# Patient Record
Sex: FEMALE | Race: WHITE | NOT HISPANIC OR LATINO
[De-identification: names, ages, dates, MRNs, and addresses within clinical notes are randomized per-mention and may not be internally consistent; named-entity substitution may affect disease eponyms.]

---

## 2017-10-12 PROBLEM — Z00.00 ENCOUNTER FOR PREVENTIVE HEALTH EXAMINATION: Status: ACTIVE | Noted: 2017-10-12

## 2017-11-16 ENCOUNTER — APPOINTMENT (OUTPATIENT)
Dept: RHEUMATOLOGY | Facility: CLINIC | Age: 37
End: 2017-11-16
Payer: COMMERCIAL

## 2017-11-16 VITALS
SYSTOLIC BLOOD PRESSURE: 122 MMHG | TEMPERATURE: 98.8 F | WEIGHT: 140 LBS | DIASTOLIC BLOOD PRESSURE: 82 MMHG | OXYGEN SATURATION: 98 % | HEART RATE: 83 BPM | BODY MASS INDEX: 22.5 KG/M2 | HEIGHT: 66 IN

## 2017-11-16 DIAGNOSIS — M21.41 FLAT FOOT [PES PLANUS] (ACQUIRED), RIGHT FOOT: ICD-10-CM

## 2017-11-16 DIAGNOSIS — Z82.61 FAMILY HISTORY OF ARTHRITIS: ICD-10-CM

## 2017-11-16 DIAGNOSIS — M76.822 POSTERIOR TIBIAL TENDINITIS, LEFT LEG: ICD-10-CM

## 2017-11-16 DIAGNOSIS — Z86.19 PERSONAL HISTORY OF OTHER INFECTIOUS AND PARASITIC DISEASES: ICD-10-CM

## 2017-11-16 DIAGNOSIS — M25.50 PAIN IN UNSPECIFIED JOINT: ICD-10-CM

## 2017-11-16 DIAGNOSIS — M21.42 FLAT FOOT [PES PLANUS] (ACQUIRED), RIGHT FOOT: ICD-10-CM

## 2017-11-16 DIAGNOSIS — Z80.3 FAMILY HISTORY OF MALIGNANT NEOPLASM OF BREAST: ICD-10-CM

## 2017-11-16 DIAGNOSIS — M79.671 PAIN IN RIGHT FOOT: ICD-10-CM

## 2017-11-16 DIAGNOSIS — T14.8XXA OTHER INJURY OF UNSPECIFIED BODY REGION, INITIAL ENCOUNTER: ICD-10-CM

## 2017-11-16 DIAGNOSIS — F41.9 ANXIETY DISORDER, UNSPECIFIED: ICD-10-CM

## 2017-11-16 DIAGNOSIS — Z72.0 TOBACCO USE: ICD-10-CM

## 2017-11-16 DIAGNOSIS — M25.562 PAIN IN RIGHT KNEE: ICD-10-CM

## 2017-11-16 DIAGNOSIS — M25.521 PAIN IN RIGHT ELBOW: ICD-10-CM

## 2017-11-16 DIAGNOSIS — Z86.59 PERSONAL HISTORY OF OTHER MENTAL AND BEHAVIORAL DISORDERS: ICD-10-CM

## 2017-11-16 DIAGNOSIS — M25.522 PAIN IN RIGHT ELBOW: ICD-10-CM

## 2017-11-16 DIAGNOSIS — M47.22 OTHER SPONDYLOSIS WITH RADICULOPATHY, CERVICAL REGION: ICD-10-CM

## 2017-11-16 DIAGNOSIS — M79.672 PAIN IN RIGHT FOOT: ICD-10-CM

## 2017-11-16 DIAGNOSIS — G89.29 PAIN IN RIGHT KNEE: ICD-10-CM

## 2017-11-16 DIAGNOSIS — M25.561 PAIN IN RIGHT KNEE: ICD-10-CM

## 2017-11-16 DIAGNOSIS — M62.838 OTHER MUSCLE SPASM: ICD-10-CM

## 2017-11-16 PROCEDURE — 99204 OFFICE O/P NEW MOD 45 MIN: CPT | Mod: 25

## 2017-11-16 PROCEDURE — 36415 COLL VENOUS BLD VENIPUNCTURE: CPT

## 2017-11-16 PROCEDURE — 20552 NJX 1/MLT TRIGGER POINT 1/2: CPT

## 2017-11-16 RX ORDER — QUETIAPINE 25 MG/1
25 TABLET, FILM COATED ORAL
Refills: 0 | Status: ACTIVE | COMMUNITY

## 2017-11-16 RX ORDER — METHYLPREDNISOLONE ACETATE 40 MG/ML
40 INJECTION, SUSPENSION INTRA-ARTICULAR; INTRALESIONAL; INTRAMUSCULAR; SOFT TISSUE
Qty: 1 | Refills: 0 | Status: COMPLETED | OUTPATIENT
Start: 2017-11-16

## 2017-11-16 RX ORDER — LIDOCAINE HYDROCHLORIDE 10 MG/ML
1 INJECTION, SOLUTION INFILTRATION; PERINEURAL
Qty: 0 | Refills: 0 | Status: COMPLETED | OUTPATIENT
Start: 2017-11-16

## 2017-11-17 PROBLEM — M25.521 PAIN OF BOTH ELBOWS: Status: ACTIVE | Noted: 2017-11-16

## 2017-11-17 PROBLEM — M21.41 PES PLANUS OF BOTH FEET: Status: ACTIVE | Noted: 2017-11-16

## 2017-11-17 PROBLEM — M62.838 TRAPEZIUS MUSCLE SPASM: Status: ACTIVE | Noted: 2017-11-16

## 2017-11-17 PROBLEM — M79.671 PAIN IN BOTH FEET: Status: ACTIVE | Noted: 2017-11-16

## 2017-11-17 PROBLEM — M25.561 CHRONIC PAIN OF BOTH KNEES: Status: ACTIVE | Noted: 2017-11-16

## 2017-11-17 PROBLEM — M25.50 ARTHRALGIA OF MULTIPLE SITES: Status: ACTIVE | Noted: 2017-11-16

## 2017-11-17 PROBLEM — T14.8XXA OVERUSE INJURY: Status: ACTIVE | Noted: 2017-11-16

## 2017-11-17 PROBLEM — M47.22 OSTEOARTHRITIS OF SPINE WITH RADICULOPATHY, CERVICAL REGION: Status: ACTIVE | Noted: 2017-11-16

## 2017-11-17 PROBLEM — M76.822 POSTERIOR TIBIAL TENDINITIS OF LEFT LOWER EXTREMITY: Status: ACTIVE | Noted: 2017-11-17

## 2017-12-29 LAB
A PHAGOCYTOPH IGG TITR SER IF: NORMAL TITER
ALBUMIN SERPL ELPH-MCNC: 4.7 G/DL
ALP BLD-CCNC: 65 U/L
ALT SERPL-CCNC: 16 U/L
ANA SER IF-ACNC: NEGATIVE
ANION GAP SERPL CALC-SCNC: 18 MMOL/L
AST SERPL-CCNC: 23 U/L
B BURGDOR AB SER QL IA: NEGATIVE
B BURGDOR AB SER-IMP: NEGATIVE
B BURGDOR IGM PATRN SER IB-IMP: POSITIVE
B BURGDOR18/20KD IGM SER QL IB: NORMAL
B BURGDOR18KD IGG SER QL IB: NORMAL
B BURGDOR23KD IGG SER QL IB: NORMAL
B BURGDOR23KD IGM SER QL IB: PRESENT
B BURGDOR28KD AB SER QL IB: NORMAL
B BURGDOR28KD IGG SER QL IB: NORMAL
B BURGDOR30KD AB SER QL IB: NORMAL
B BURGDOR30KD IGG SER QL IB: NORMAL
B BURGDOR31KD IGG SER QL IB: NORMAL
B BURGDOR31KD IGM SER QL IB: NORMAL
B BURGDOR39KD IGG SER QL IB: NORMAL
B BURGDOR39KD IGM SER QL IB: NORMAL
B BURGDOR41KD IGG SER QL IB: NORMAL
B BURGDOR41KD IGM SER QL IB: PRESENT
B BURGDOR45KD AB SER QL IB: NORMAL
B BURGDOR45KD IGG SER QL IB: NORMAL
B BURGDOR58KD AB SER QL IB: NORMAL
B BURGDOR58KD IGG SER QL IB: NORMAL
B BURGDOR66KD IGG SER QL IB: NORMAL
B BURGDOR66KD IGM SER QL IB: NORMAL
B BURGDOR93KD IGG SER QL IB: NORMAL
B BURGDOR93KD IGM SER QL IB: NORMAL
B MICROTI IGG TITR SER: NORMAL TITER
BASOPHILS # BLD AUTO: 0.03 K/UL
BASOPHILS NFR BLD AUTO: 0.4 %
BILIRUB SERPL-MCNC: 0.3 MG/DL
BUN SERPL-MCNC: 18 MG/DL
CALCIUM SERPL-MCNC: 9.2 MG/DL
CCP AB SER IA-ACNC: <8 UNITS
CHLORIDE SERPL-SCNC: 103 MMOL/L
CO2 SERPL-SCNC: 23 MMOL/L
CREAT SERPL-MCNC: 0.78 MG/DL
CRP SERPL-MCNC: 0.2 MG/DL
E CHAFFEENSIS IGG TITR SER IF: NORMAL TITER
EOSINOPHIL # BLD AUTO: 0.06 K/UL
EOSINOPHIL NFR BLD AUTO: 0.9
ERYTHROCYTE [SEDIMENTATION RATE] IN BLOOD BY WESTERGREN METHOD: 13 MM/HR
GLUCOSE SERPL-MCNC: 73 MG/DL
HCT VFR BLD CALC: 42.6 %
HGB BLD-MCNC: 14.3 G/DL
IMM GRANULOCYTES NFR BLD AUTO: 0.3 %
LYMPHOCYTES # BLD AUTO: 2.27 K/UL
LYMPHOCYTES NFR BLD AUTO: 33.2 %
MAN DIFF?: NORMAL
MCHC RBC-ENTMCNC: 30.3 PG
MCHC RBC-ENTMCNC: 33.6 GM/DL
MCV RBC AUTO: 90.3 FL
MONOCYTES # BLD AUTO: 0.58 K/UL
MONOCYTES NFR BLD AUTO: 8.5 %
NEUTROPHILS # BLD AUTO: 3.87 K/UL
NEUTROPHILS NFR BLD AUTO: 56.7 %
PLATELET # BLD AUTO: 396 K/UL
POTASSIUM SERPL-SCNC: 4.2 MMOL/L
PROT SERPL-MCNC: 7.1 G/DL
RBC # BLD: 4.72 M/UL
RBC # FLD: 12.4 %
RF+CCP IGG SER-IMP: NEGATIVE
RHEUMATOID FACT SER QL: <7 IU/ML
SODIUM SERPL-SCNC: 144 MMOL/L
URATE SERPL-MCNC: 5.8 MG/DL
WBC # FLD AUTO: 6.83 K/UL

## 2018-01-11 ENCOUNTER — APPOINTMENT (OUTPATIENT)
Dept: RHEUMATOLOGY | Facility: CLINIC | Age: 38
End: 2018-01-11

## 2022-04-11 ENCOUNTER — APPOINTMENT (OUTPATIENT)
Dept: ORTHOPEDIC SURGERY | Facility: CLINIC | Age: 42
End: 2022-04-11

## 2022-04-11 ENCOUNTER — APPOINTMENT (OUTPATIENT)
Dept: ORTHOPEDIC SURGERY | Facility: CLINIC | Age: 42
End: 2022-04-11
Payer: MEDICAID

## 2022-04-11 VITALS — HEIGHT: 66 IN | BODY MASS INDEX: 24.11 KG/M2 | WEIGHT: 150 LBS

## 2022-04-11 DIAGNOSIS — Z78.9 OTHER SPECIFIED HEALTH STATUS: ICD-10-CM

## 2022-04-11 PROCEDURE — 73080 X-RAY EXAM OF ELBOW: CPT | Mod: RT

## 2022-04-11 PROCEDURE — 99024 POSTOP FOLLOW-UP VISIT: CPT

## 2022-04-11 RX ORDER — ESCITALOPRAM OXALATE 10 MG/1
10 TABLET ORAL
Qty: 30 | Refills: 0 | Status: ACTIVE | COMMUNITY
Start: 2021-09-02

## 2022-04-11 RX ORDER — OXYCODONE 5 MG/1
5 TABLET ORAL
Qty: 15 | Refills: 0 | Status: ACTIVE | COMMUNITY
Start: 2022-02-28

## 2022-04-11 RX ORDER — ARIPIPRAZOLE 20 MG/1
20 TABLET ORAL
Qty: 30 | Refills: 0 | Status: ACTIVE | COMMUNITY
Start: 2022-01-21

## 2022-04-11 RX ORDER — DEXTROAMPHETAMINE SACCHARATE, AMPHETAMINE ASPARTATE, DEXTROAMPHETAMINE SULFATE AND AMPHETAMINE SULFATE 5; 5; 5; 5 MG/1; MG/1; MG/1; MG/1
20 TABLET ORAL
Qty: 30 | Refills: 0 | Status: ACTIVE | COMMUNITY
Start: 2022-01-06

## 2022-04-11 RX ORDER — HYDROXYZINE HYDROCHLORIDE 25 MG/1
25 TABLET ORAL
Qty: 60 | Refills: 0 | Status: ACTIVE | COMMUNITY
Start: 2022-02-22

## 2022-04-11 RX ORDER — HYDROXYZINE PAMOATE 50 MG/1
50 CAPSULE ORAL
Qty: 30 | Refills: 0 | Status: ACTIVE | COMMUNITY
Start: 2022-02-05

## 2022-04-11 RX ORDER — ALPRAZOLAM 0.5 MG/1
0.5 TABLET ORAL
Qty: 2 | Refills: 0 | Status: ACTIVE | COMMUNITY
Start: 2022-02-03

## 2022-04-11 RX ORDER — HYDROXYZINE HYDROCHLORIDE 50 MG/1
50 TABLET ORAL
Qty: 60 | Refills: 0 | Status: ACTIVE | COMMUNITY
Start: 2022-03-22

## 2022-04-11 RX ORDER — LUMATEPERONE 42 MG/1
42 CAPSULE ORAL
Qty: 30 | Refills: 0 | Status: ACTIVE | COMMUNITY
Start: 2022-03-22

## 2022-04-11 RX ORDER — DEXTROAMPHETAMINE SACCHARATE, AMPHETAMINE ASPARTATE, DEXTROAMPHETAMINE SULFATE AND AMPHETAMINE SULFATE 7.5; 7.5; 7.5; 7.5 MG/1; MG/1; MG/1; MG/1
30 TABLET ORAL
Qty: 30 | Refills: 0 | Status: ACTIVE | COMMUNITY
Start: 2022-01-06
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
## 2022-04-11 NOTE — PHYSICAL EXAM
[de-identified] : Right elbow with resolved swelling, no erythema. Incision well healed - no erythema nor drainage. +ttp laterally and medially. Arc from  with no pain. Able to make fist, oppose thumb to small finger and abduct fingers. Sensation intact throughout. <2sec cap refill.\par \par Right elbow radiographs with evidence of concentric radiocapitellar and ulnohumeral joint with intact IJS; HO anterior to radial neck.

## 2022-04-11 NOTE — ASSESSMENT
[FreeTextEntry1] : s/p right elbow open reduction, application of IJS and reconstruction of LCLC [2-17-22] - progressing well postoperatively. Out of brace. OT.\par \par F/u 2.5month (advance ROM)

## 2022-06-20 ENCOUNTER — APPOINTMENT (OUTPATIENT)
Dept: ORTHOPEDIC SURGERY | Facility: CLINIC | Age: 42
End: 2022-06-20
Payer: MEDICAID

## 2022-06-20 PROCEDURE — 73080 X-RAY EXAM OF ELBOW: CPT | Mod: RT

## 2022-06-20 PROCEDURE — 99214 OFFICE O/P EST MOD 30 MIN: CPT

## 2022-07-10 ENCOUNTER — FORM ENCOUNTER (OUTPATIENT)
Age: 42
End: 2022-07-10
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
## 2022-07-10 NOTE — ASSESSMENT
[FreeTextEntry1] : s/p right elbow open reduction, application of IJS and reconstruction of LCL [2-17-22] - progressing well postoperatively. Out of brace. OT.\par \par Patient indicted for removal of internal joint stabilizer and contracture release ~September 2022. Discussed risks, benefits and alteratives to surgery including infection/wound dehiscence, pain, continued stiffness, instability, neurovascular injury, tendon injury, DVT and medical complications associated with anesthesia. Patient understood this conversation, questions were answered and patient elected to proceed with surgery.\par \par Plan for right elbow removal of deep hardware and subsequent contracture release. Regional block + General. [facility]. September.\par \par F/u August (obtain CT elbow to delineate HO)

## 2022-07-10 NOTE — PHYSICAL EXAM
[de-identified] : Right elbow with resolved swelling, no erythema. Incision well healed - no erythema nor drainage. +ttp laterally and medially. Arc from  with no pain. Able to make fist, oppose thumb to small finger and abduct fingers. Sensation intact throughout. <2sec cap refill.\par \par Right elbow radiographs with evidence of concentric radiocapitellar and ulnohumeral joint with intact IJS; HO anterior to radial neck.

## 2022-07-19 ENCOUNTER — FORM ENCOUNTER (OUTPATIENT)
Age: 42
End: 2022-07-19

## 2022-08-08 ENCOUNTER — APPOINTMENT (OUTPATIENT)
Dept: ORTHOPEDIC SURGERY | Facility: CLINIC | Age: 42
End: 2022-08-08

## 2022-08-08 PROCEDURE — 99214 OFFICE O/P EST MOD 30 MIN: CPT | Mod: 57
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
## 2022-08-08 NOTE — ASSESSMENT
[FreeTextEntry1] : s/p right elbow open reduction, application of IJS and reconstruction of LCL [2-17-22] - progressing well postoperatively. Out of brace. OT.\par \par Patient indicted for removal of internal joint stabilizer and contracture release as well as cubital tunnel decompression with possible anterior transposition in light of persistent ulnar digit tingling and persistent symptoms with flexion of elbow. Discussed risks, benefits and alteratives to surgery including infection/wound dehiscence, pain, continued stiffness, instability, neurovascular injury, tendon injury, continued ulnar neuritis, DVT and medical complications associated with anesthesia. Patient understood this conversation, questions were answered and patient elected to proceed with surgery.\par \par Plan for right elbow removal of deep hardware and subsequent contracture release and right cubital tunnel decompression with possible anterior transposition. Regional block + General. [facility]. September.\par \par Will obtain right elbow CT to evaluate for HO to be addressed during contracture release.\par \par F/u 10 days after surgery

## 2022-08-14 ENCOUNTER — FORM ENCOUNTER (OUTPATIENT)
Age: 42
End: 2022-08-14

## 2022-08-16 ENCOUNTER — RESULT REVIEW (OUTPATIENT)
Age: 42
End: 2022-08-16

## 2022-09-08 ENCOUNTER — APPOINTMENT (OUTPATIENT)
Dept: ORTHOPEDIC SURGERY | Facility: HOSPITAL | Age: 42
End: 2022-09-08
Payer: MEDICAID

## 2022-09-08 PROCEDURE — 24300 MNPJ ELBOW UNDER ANES: CPT | Mod: 59,RT

## 2022-09-08 PROCEDURE — 64718 REVISE ULNAR NERVE AT ELBOW: CPT | Mod: 59,RT

## 2022-09-08 PROCEDURE — 24160 RMVL PROSTHHUMRL&ULNAR CMPNT: CPT | Mod: RT

## 2022-09-12 ENCOUNTER — APPOINTMENT (OUTPATIENT)
Dept: ORTHOPEDIC SURGERY | Facility: CLINIC | Age: 42
End: 2022-09-12

## 2022-09-12 PROCEDURE — 99024 POSTOP FOLLOW-UP VISIT: CPT

## 2022-09-12 RX ORDER — OXYCODONE 5 MG/1
5 TABLET ORAL EVERY 6 HOURS
Qty: 20 | Refills: 0 | Status: ACTIVE | COMMUNITY
Start: 2022-09-12 | End: 1900-01-01

## 2022-09-12 RX ORDER — GABAPENTIN 100 MG/1
100 CAPSULE ORAL 3 TIMES DAILY
Qty: 42 | Refills: 0 | Status: ACTIVE | COMMUNITY
Start: 2022-09-12 | End: 1900-01-01

## 2022-09-15 ENCOUNTER — FORM ENCOUNTER (OUTPATIENT)
Age: 42
End: 2022-09-15

## 2022-09-16 NOTE — ASSESSMENT
[FreeTextEntry1] : s/p right elbow open reduction, application of IJS and reconstruction of LCL [2-17-22] and s/p right elbow hardware removal and cubital tunnel decompression [9-8-22] - progressing well postoperatively. OT for AROM/PROM. Permitted to [facility].\par \par F/u 4 weeks\par
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
## 2022-09-16 NOTE — PHYSICAL EXAM
[de-identified] : Right elbow with resolved swelling, no erythema. Incision well healed both medial and lateral - no erythema nor drainage.  Arc from  with no pain. Able to make fist, oppose thumb to small finger and abduct fingers. Pesistent decreased sensation at ulnar digits and hand. <2sec cap refill.\par \par Right elbow radiographs with evidence of concentric radiocapitellar and ulnohumeral joint with intact IJS; HO anterior to radial neck.

## 2022-09-19 ENCOUNTER — APPOINTMENT (OUTPATIENT)
Dept: ORTHOPEDIC SURGERY | Facility: CLINIC | Age: 42
End: 2022-09-19

## 2022-09-25 ENCOUNTER — FORM ENCOUNTER (OUTPATIENT)
Age: 42
End: 2022-09-25

## 2022-10-12 ENCOUNTER — APPOINTMENT (OUTPATIENT)
Dept: ORTHOPEDIC SURGERY | Facility: CLINIC | Age: 42
End: 2022-10-12

## 2022-10-12 PROCEDURE — 99024 POSTOP FOLLOW-UP VISIT: CPT

## 2022-10-12 NOTE — ASSESSMENT
[FreeTextEntry1] : s/p right elbow open reduction, application of IJS and reconstruction of LCL [2-17-22] and s/p right elbow hardware removal and cubital tunnel decompression [9-8-22] - progressing well postoperatively. OT for AROM/PROM. Permitted to WB.\par \par F/u 6mo\par
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
## 2022-10-12 NOTE — PHYSICAL EXAM
[de-identified] : Right elbow with resolved swelling, no erythema. Incision well healed both medial and lateral - no erythema nor drainage.  Arc from  with no pain. Pro/Sup to 80/80.  Able to make fist, oppose thumb to small finger and abduct fingers. Improving sensation at ulnar digits and hand. <2sec cap refill.\par \par Right elbow radiographs with evidence of concentric radiocapitellar and ulnohumeral joint with intact IJS; HO anterior to radial neck.

## 2023-04-03 ENCOUNTER — APPOINTMENT (OUTPATIENT)
Dept: ORTHOPEDIC SURGERY | Facility: CLINIC | Age: 43
End: 2023-04-03
Payer: MEDICAID

## 2023-04-03 DIAGNOSIS — S53.104D UNSPECIFIED DISLOCATION OF RIGHT ULNOHUMERAL JOINT, SUBSEQUENT ENCOUNTER: ICD-10-CM

## 2023-04-03 PROCEDURE — 99213 OFFICE O/P EST LOW 20 MIN: CPT

## 2023-04-03 NOTE — ASSESSMENT
[FreeTextEntry1] : s/p right elbow open reduction, application of IJS and reconstruction of LCL [2-17-22] and s/p right elbow hardware removal and cubital tunnel decompression [9-8-22] - progressing well postoperatively. OT for AROM/PROM. Permitted to [facility].\par \par F/u prn\par
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
